# Patient Record
Sex: MALE | Race: WHITE | URBAN - METROPOLITAN AREA
[De-identification: names, ages, dates, MRNs, and addresses within clinical notes are randomized per-mention and may not be internally consistent; named-entity substitution may affect disease eponyms.]

---

## 2022-03-10 ENCOUNTER — OFFICE VISIT (OUTPATIENT)
Dept: PRIMARY CARE CLINIC | Age: 32
End: 2022-03-10
Payer: COMMERCIAL

## 2022-03-10 VITALS
WEIGHT: 248.6 LBS | HEART RATE: 84 BPM | DIASTOLIC BLOOD PRESSURE: 78 MMHG | SYSTOLIC BLOOD PRESSURE: 122 MMHG | TEMPERATURE: 97.7 F | BODY MASS INDEX: 36.82 KG/M2 | OXYGEN SATURATION: 98 % | HEIGHT: 69 IN

## 2022-03-10 DIAGNOSIS — Z76.89 ENCOUNTER TO ESTABLISH CARE: Primary | ICD-10-CM

## 2022-03-10 PROCEDURE — 99202 OFFICE O/P NEW SF 15 MIN: CPT

## 2022-03-10 ASSESSMENT — ENCOUNTER SYMPTOMS
VOMITING: 0
NAUSEA: 0
SHORTNESS OF BREATH: 0
CHEST TIGHTNESS: 0
COUGH: 0

## 2022-03-10 ASSESSMENT — PATIENT HEALTH QUESTIONNAIRE - PHQ9
SUM OF ALL RESPONSES TO PHQ QUESTIONS 1-9: 0
2. FEELING DOWN, DEPRESSED OR HOPELESS: 0
SUM OF ALL RESPONSES TO PHQ QUESTIONS 1-9: 0
1. LITTLE INTEREST OR PLEASURE IN DOING THINGS: 0
SUM OF ALL RESPONSES TO PHQ9 QUESTIONS 1 & 2: 0

## 2022-03-10 NOTE — LETTER
[address]  [address] [facility]  [address] [address]  Phone: [phone]  Fax: [phone]    AVRIL Bucio CNP        March 10, 2022     Patient: Omar Cristina   YOB: 1990   Date of Visit: 3/10/2022       To Whom It May Concern: It is my medical opinion that Omar Cristina has fully recovered from COVID-19. He tested positive on 12/1/21in the [place]. At this time, he has no residual symptoms or health concerns related to his previous COVID-19 diagnosis. This medical statement is valid until 6/1/22. If you have any questions or concerns, please don't hesitate to call.     Sincerely,        AVRIL Bucio CNP

## 2022-03-10 NOTE — PROGRESS NOTES
[id number] [facility]  Establish care visit   3/10/2022    Lyubov Marquez (:  1990) is a 27 y.o. male, here to establish care. Chief Complaint   Patient presents with    New Patient    Other     future travel; needs verification of being healthy        ASSESSMENT/ PLAN  1. Encounter to establish care  - No indication for labs today  - No acute needs or concerns       Return in about 1 year (around 3/10/2023) for Physical.    HPI  Here today to establish care. In his normal state of health. However, he plans to move to [facility] in July. He is a  at [address] [place] [address] in [place]. He has been a  for 3 years. In his free time, he likes to hunt and play with model trains. He enjoys playing disc golf. He is planning on going to [place] to visit the [address] [place] [address] in May. [facility] and is requesting a \"certificate of recovery. \" Reports having a mild case of COVID. No trouble breathing, chest pain, etc. Did not have to go to the hospital. Felt fatigued and worn down. Stayed pretty active. He is unvaccinated. ROS  Review of Systems   Constitutional: Negative for chills, fatigue, fever and unexpected weight change. HENT: Negative. Respiratory: Negative for cough, chest tightness and shortness of breath. Cardiovascular: Negative for chest pain. Gastrointestinal: Negative for nausea and vomiting. Endocrine: Negative for polydipsia, polyphagia and polyuria. Genitourinary: Negative for frequency. Musculoskeletal: Negative for myalgias. Skin: Negative. Neurological: Negative for dizziness and weakness. Psychiatric/Behavioral: Negative. HISTORIES  No current outpatient medications on file prior to visit. No current facility-administered medications on file prior to visit. No Known Allergies    History reviewed. No pertinent past medical history. There is no problem list on file for this patient.       Past Surgical History: Procedure Laterality Date    WISDOM TOOTH EXTRACTION      2010       Social History     Socioeconomic History    Marital status: Single     Spouse name: Not on file    Number of children: Not on file    Years of education: Not on file    Highest education level: Not on file   Occupational History    Not on file   Tobacco Use    Smoking status: Current Every Day Smoker     Packs/day: 0.50     Types: Cigarettes, Cigars, Pipe     Start date: 3/3/2008    Smokeless tobacco: Former User     Types: Chew     Quit date: 3/4/2013   Vaping Use    Vaping Use: Never used   Substance and Sexual Activity    Alcohol use: Yes     Comment: 7 drinks a week    Drug use: Never    Sexual activity: Never   Other Topics Concern    Not on file   Social History Narrative    Not on file     Social Determinants of Health     Financial Resource Strain:     Difficulty of Paying Living Expenses: Not on file   Food Insecurity:     Worried About [address] in the Last Year: Not on file    [address] in the Last Year: Not on file   Transportation Needs:     Lack of Transportation (Medical): Not on file    Lack of Transportation (Non-Medical):  Not on file   Physical Activity:     Days of Exercise per Week: Not on file    Minutes of Exercise per Session: Not on file   Stress:     Feeling of Stress : Not on file   Social Connections:     Frequency of Communication with Friends and Family: Not on file    Frequency of Social Gatherings with Friends and Family: Not on file    Attends Spiritism Services: Not on file    Active Member of Clubs or Organizations: Not on file    Attends Club or Organization Meetings: Not on file    Marital Status: Not on file   Intimate Partner Violence:     Fear of Current or Ex-Partner: Not on file    Emotionally Abused: Not on file    Physically Abused: Not on file    Sexually Abused: Not on file   Housing Stability:     Unable to Pay for Housing in the Last Year: Not on file  Number of Places Lived in the Last Year: Not on file    Unstable Housing in the Last Year: Not on file        Family History   Problem Relation Age of Onset    Deep Vein Thrombosis Mother     Cancer Father         Prostate       PE  Vitals:    03/10/22 0836   BP: 122/78   Pulse: 84   Temp: 97.7 °F (36.5 °C)   SpO2: 98%   Weight: 248 lb 9.6 oz (112.8 kg)   Height: 5' 9\" (1.753 m)     Estimated body mass index is 36.71 kg/m² as calculated from the following:    Height as of this encounter: 5' 9\" (1.753 m). Weight as of this encounter: 248 lb 9.6 oz (112.8 kg). Physical Exam  Vitals reviewed. Constitutional:       Appearance: Normal appearance. HENT:      Head: Normocephalic. Right Ear: External ear normal.      Left Ear: External ear normal.      Mouth/Throat:      Mouth: Mucous membranes are moist.   Eyes:      Extraocular Movements: Extraocular movements intact. Pupils: Pupils are equal, round, and reactive to light. Cardiovascular:      Rate and Rhythm: Normal rate and regular rhythm. Pulses: Normal pulses. Heart sounds: Normal heart sounds. Pulmonary:      Effort: Pulmonary effort is normal.      Breath sounds: Normal breath sounds. Abdominal:      General: Abdomen is flat. Bowel sounds are normal.      Palpations: Abdomen is soft. Musculoskeletal:         General: Normal range of motion. Cervical back: Normal range of motion and neck supple. Skin:     General: Skin is warm and dry. Neurological:      General: No focal deficit present. Mental Status: He is alert. Psychiatric:         Mood and Affect: Mood normal.           There is no immunization history on file for this patient.     Health Maintenance   Topic Date Due    Pneumococcal 0-64 years Vaccine (1 of 2 - PPSV23) Never done    Hepatitis A vaccine (2 of 2 - 2-dose series) 01/10/2008    Flu vaccine (1) Never done    COVID-19 Vaccine (1) 03/10/2023 (Originally 9/16/1994)    DTaP/Tdap/Td vaccine (7 - Td or Tdap) 03/29/2022    Depression Screen  03/10/2023    Hib vaccine  Completed    Varicella vaccine  Completed    Hepatitis B vaccine  Aged Out    Meningococcal (ACWY) vaccine  Aged Out    Hepatitis C screen  Discontinued    HIV screen  Discontinued       PSH, PMH, SH and FH reviewed and noted. Recent and past labs, tests and consults also reviewed. Recent or new meds also reviewed.     Daniela Menezes, APRN - CNP